# Patient Record
Sex: FEMALE
[De-identification: names, ages, dates, MRNs, and addresses within clinical notes are randomized per-mention and may not be internally consistent; named-entity substitution may affect disease eponyms.]

---

## 2017-09-28 ENCOUNTER — HOSPITAL ENCOUNTER (OUTPATIENT)
Dept: HOSPITAL 42 - ENDO | Age: 77
Discharge: HOME | End: 2017-09-28
Attending: SPECIALIST
Payer: MEDICARE

## 2017-09-28 VITALS
SYSTOLIC BLOOD PRESSURE: 19 MMHG | HEART RATE: 57 BPM | DIASTOLIC BLOOD PRESSURE: 63 MMHG | RESPIRATION RATE: 19 BRPM | TEMPERATURE: 97.6 F | OXYGEN SATURATION: 100 %

## 2017-09-28 VITALS — BODY MASS INDEX: 18.8 KG/M2

## 2017-09-28 DIAGNOSIS — K64.8: ICD-10-CM

## 2017-09-28 DIAGNOSIS — K57.30: Primary | ICD-10-CM

## 2017-11-27 ENCOUNTER — HOSPITAL ENCOUNTER (EMERGENCY)
Dept: HOSPITAL 42 - ED | Age: 77
Discharge: HOME | End: 2017-11-27
Payer: MEDICARE

## 2017-11-27 VITALS — BODY MASS INDEX: 18.8 KG/M2

## 2017-11-27 VITALS — SYSTOLIC BLOOD PRESSURE: 118 MMHG | RESPIRATION RATE: 16 BRPM | HEART RATE: 92 BPM | DIASTOLIC BLOOD PRESSURE: 73 MMHG

## 2017-11-27 VITALS — OXYGEN SATURATION: 100 %

## 2017-11-27 VITALS — TEMPERATURE: 97.5 F

## 2017-11-27 DIAGNOSIS — D64.9: Primary | ICD-10-CM

## 2017-11-27 DIAGNOSIS — E11.9: ICD-10-CM

## 2017-11-27 DIAGNOSIS — Z88.0: ICD-10-CM

## 2017-11-27 DIAGNOSIS — Z87.891: ICD-10-CM

## 2017-11-27 DIAGNOSIS — R63.4: ICD-10-CM

## 2017-11-27 LAB
ALBUMIN/GLOB SERPL: 1.3 {RATIO} (ref 1.1–1.8)
ALP SERPL-CCNC: 85 U/L (ref 38–126)
ALT SERPL-CCNC: 26 U/L (ref 7–56)
AST SERPL-CCNC: 23 U/L (ref 14–36)
BASOPHILS # BLD AUTO: 0.06 K/MM3 (ref 0–2)
BASOPHILS NFR BLD: 0.6 % (ref 0–3)
BILIRUB SERPL-MCNC: 0.7 MG/DL (ref 0.2–1.3)
BUN SERPL-MCNC: 19 MG/DL (ref 7–21)
CALCIUM SERPL-MCNC: 9.8 MG/DL (ref 8.4–10.5)
CHLORIDE SERPL-SCNC: 89 MMOL/L (ref 98–107)
CO2 SERPL-SCNC: 25 MMOL/L (ref 21–33)
EOSINOPHIL # BLD: 0.1 10*3/UL (ref 0–0.7)
EOSINOPHIL NFR BLD: 1.1 % (ref 1.5–5)
ERYTHROCYTE [DISTWIDTH] IN BLOOD BY AUTOMATED COUNT: 15.7 % (ref 11.5–14.5)
GLOBULIN SER-MCNC: 3.2 GM/DL
GLUCOSE SERPL-MCNC: 100 MG/DL (ref 70–110)
GRANULOCYTES # BLD: 8.42 10*3/UL (ref 1.4–6.5)
GRANULOCYTES NFR BLD: 79.7 % (ref 50–68)
HCT VFR BLD CALC: 28.1 % (ref 36–48)
LYMPHOCYTES # BLD: 1.4 10*3/UL (ref 1.2–3.4)
LYMPHOCYTES NFR BLD AUTO: 13.6 % (ref 22–35)
MCH RBC QN AUTO: 22.7 PG (ref 25–35)
MCHC RBC AUTO-ENTMCNC: 31 G/DL (ref 31–37)
MCV RBC AUTO: 73.2 FL (ref 80–105)
MONOCYTES # BLD AUTO: 0.5 10*3/UL (ref 0.1–0.6)
MONOCYTES NFR BLD: 5 % (ref 1–6)
PLATELET # BLD: 428 10^3/UL (ref 120–450)
PMV BLD AUTO: 8.7 FL (ref 7–11)
POTASSIUM SERPL-SCNC: 3.2 MMOL/L (ref 3.6–5)
PROT SERPL-MCNC: 7.2 G/DL (ref 5.8–8.3)
SODIUM SERPL-SCNC: 128 MMOL/L (ref 132–148)
TROPONIN I SERPL-MCNC: < 0.01 NG/ML
WBC # BLD AUTO: 10.6 10^3/UL (ref 4.5–11)

## 2017-11-27 PROCEDURE — 84484 ASSAY OF TROPONIN QUANT: CPT

## 2017-11-27 PROCEDURE — 85025 COMPLETE CBC W/AUTO DIFF WBC: CPT

## 2017-11-27 PROCEDURE — 82550 ASSAY OF CK (CPK): CPT

## 2017-11-27 PROCEDURE — 80053 COMPREHEN METABOLIC PANEL: CPT

## 2017-11-27 PROCEDURE — 71010: CPT

## 2017-11-27 PROCEDURE — 96360 HYDRATION IV INFUSION INIT: CPT

## 2017-11-27 PROCEDURE — 99285 EMERGENCY DEPT VISIT HI MDM: CPT

## 2017-11-27 PROCEDURE — 93005 ELECTROCARDIOGRAM TRACING: CPT

## 2017-11-27 PROCEDURE — 83615 LACTATE (LD) (LDH) ENZYME: CPT

## 2017-11-27 NOTE — ED PDOC
Arrival/HPI





- General


Chief Complaint: Abdominal Pain


Time Seen by Provider: 11/27/17 17:38


Historian: Patient





- History of Present Illness


Narrative History of Present Illness (Text): 


11/27/17 17:42


A 77 year old female, whose past medical history includes PAD, CAD, peripheral 

neuropathy, syringomyelia, bullous pemphigous p/w lightheadedness, was sent by 

Dr. Singer for evaluation due to patient not appearing well. Patient of Dr. Rosa and Dr. Singer. For the last 2 years, patient has lost 95 lbs due to 

having difficulty eating. Patient experiences abdominal pain when she eats, and 

pain has worsened since last week. She has had workup evaluation for unknown 

cancer. Patient has been seen by PMD, pulmonologist, nephrologist, and has had 

multiple CT scans of the chest, abdomen, and pelvis. Also, patient has had EGD 

and PET scan lit up in axilla; along with four biopsy examinations, all which 

were negative. Today, patient became hypotensive. Patient no longer smokes, and 

denies EtOH consumption.





PMD: Dr. Tamar Singer








Associated Symptoms (Text): 





11/27/17 20:32


Significant weight loss over the last 2 years. Patient has been seen at 

[facility] multiple times for evaluation of an unknown cancer. 

She has had multiple imaging procedures and also colonoscopy and EGD without a 

diagnosis being made. Today she was seen by her nephrologist and directed to 

the emergency department for "not looking well". I spoke with  who 

reported that the patient was hypotensive in her office. She requested fluids 

and blood work.





Past Medical History





- Provider Review


Nursing Documentation Reviewed: Yes





- Infectious Disease


Hx of Infectious Diseases: None





- Cardiac


Hx Pacemaker: No





- Pulmonary


Hx Pneumonia: Yes





- Neurological


Hx Paralysis: No





- HEENT


Hx HEENT Disorder: Yes ([place] r ear)


Hx Cataracts: Yes (sx)





- Endocrine/Metabolic


Hx Diabetes Mellitus Type 2: Yes





- Hematological/Oncological


Hx Blood Transfusions: No


Hx Blood Transfusion Reaction: No





- Integumentary


Other/Comment: pemphigoid, bullous dermatoses, in remission at present time, 

multiple red discolorations to arms from plavix





- Musculoskeletal/Rheumatological


Hx Musculoskeletal Disorders: No





- Gastrointestinal


Hx Gall Bladder Disease: Yes


Hx Gastroesophageal Reflux: Yes


Hx Gastrointestinal Ulcer: Yes





- Genitourinary/Gynecological


Hx Genitourinary Disorders:  (renal insufficiency)


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 

tubal





- Psychiatric


Hx Psychophysiologic Disorder: No


Hx Substance Use: No





- Surgical History


Hx Cholecystectomy: Yes


Other/Comment: salpingectomy, craniotomy at [facility] to correct syringomiella





- Anesthesia


Hx Anesthesia Reactions: No


Hx Malignant Hyperthermia: No





- Suicidal Assessment


Feels Threatened In Home Enviroment: No





Family/Social History





- Physician Review


Nursing Documentation Reviewed: Yes


Family/Social History: No Known Family HX


Smoking Status: Former Smoker


Hx Alcohol Use: No


Hx Substance Use: No


Hx Substance Use Treatment: No





Allergies/Home Meds


Allergies/Adverse Reactions: 


Allergies





Penicillins Allergy (Verified 11/27/17 17:01)


 RASH


sucralfate [From Carafate] Adverse Reaction (Verified 11/27/17 17:01)


 VOMITING








Home Medications: 


 Home Meds











 Medication  Instructions  Recorded  Confirmed


 


Alprazolam [Xanax] 0.5 mg PO DAILY 06/26/12 11/27/17


 


hydroCHLOROthiazide [Microzide] 12.5 mg PO QOTHERDAY 06/26/12 11/27/17


 


Aspirin 81 mg PO DAILY 11/28/12 11/27/17


 


Cetirizine HCl [Zyrtec Allergy] 10 mg PO HS 03/17/16 11/27/17


 


Clopidogrel [Plavix] 75 mg PO DAILY 03/17/16 11/27/17


 


Metoprolol Succinate [Toprol XL] 25 mg PO DAILY 03/17/16 11/27/17


 


Simvastatin [Zocor] 20 mg PO DAILY 03/17/16 11/27/17


 


Cholecalciferol (Vitamin D3) 1,000 iu PO DAILY 05/11/16 11/27/17





[Vitamin D]   


 


Ubidecarenone [Co Q10] 100 mg PO DAILY 05/11/16 11/27/17


 


Omeprazole [Prilosec] 40 mg PO DAILY 05/12/16 11/27/17














Review of Systems





- Physician Review


All systems were reviewed & negative as marked: Yes





- Review of Systems


Constitutional: Fatigue, Weight Change.  absent: Fevers


Respiratory: absent: SOB


Cardiovascular: absent: Chest Pain, Palpitations, Syncope


Gastrointestinal: Abdominal Pain (caused by eating), Appetite Changes (has been 

unable to eat due to abdominal pain caused by eating; resulting in 95 lb weight 

loss), Anorexia.  absent: Constipation, Diarrhea, Nausea, Vomiting


Genitourinary Female: absent: Dysuria, Frequency, Hematuria


Neurological: absent: Headache, Dizziness, Focal Weakness





Physical Exam


Vital Signs Reviewed: Yes


Vital Signs











  Temp Pulse Resp BP Pulse Ox


 


 11/27/17 20:43   92 H  16  118/73  100


 


 11/27/17 19:16   82  17  136/75  100


 


 11/27/17 16:52  97.5 F L  105 H  18  99/69 L  95











Temperature: Afebrile


Blood Pressure: Normal


Pulse: Regular


Respiratory Rate: Normal


Appearance: Positive for: Cachectic, Other (thin, chronically ill-appearing, 

not acutely ill-appearing)


Pain Distress: None


Mental Status: Positive for: Alert and Oriented X 3





- Systems Exam


Head: Present: Atraumatic, Normocephalic


Pupils: Present: PERRL


Extroacular Muscles: Present: EOMI


Conjunctiva: Present: Normal


Mouth: Present: Moist Mucous Membranes


Pharnyx: No: ERYTHEMA, EXUDATE, TONSILS ENLARGED


Neck: Present: Normal Range of Motion


Respiratory/Chest: Present: Decreased Breath Sounds (diminshed breath sounds 

bilaterally).  No: Respiratory Distress, Accessory Muscle Use, Wheezes, Rales, 

Retracting, Rhonchi, Tachypneic


Cardiovascular: Present: Regular Rate and Rhythm, Normal S1, S2.  No: Murmurs


Abdomen: Present: Normal Bowel Sounds.  No: Tenderness, Distention, Peritoneal 

Signs


Back: Present: Normal Inspection


Upper Extremity: Present: Normal Inspection.  No: Cyanosis, Edema


Lower Extremity: Present: Normal Inspection.  No: Edema


Neurological: Present: GCS=15, CN II-XII Intact, Speech Normal, Motor Func 

Grossly Intact


Skin: Present: Warm, Dry, Pale.  No: Rashes


Lymphatic: No: Cervical Adenopathy


Psychiatric: Present: Alert, Oriented x 3, Normal Insight, Normal Concentration





Medical Decision Making


ED Course and Treatment: 


11/27/17 17:47


Impression: 77 year old female sent in by PMD. Physical exam shows patient 

appears thin, pale, ill-appearing, cachectic, and has diminished breath sounds 

bilaterally.





Plan:


-- EKG


-- Chest X-ray


-- Labs


-- IV Fluids


-- Reassess and disposition





Prior Visits:


Notes and results from previous visits were reviewed. Patient was last seen in 

the emergency department on 03/17/2016 for vomiting, diarrhea, and weight loss. 

Patient was admitted.








Progress Notes:











11/27/17 20:35


EKG shows normal sinus rhythm rate approximately 90 with no acute ST or T-wave 

changes





- Lab Interpretations


Lab Results: 








 11/27/17 19:02 





 11/27/17 19:02 





 Lab Results





11/27/17 19:02: Sodium 128 L, Potassium 3.2 L, Chloride 89 L, Carbon Dioxide 25

, Anion Gap 17, BUN 19, Creatinine 1.0, Est GFR (African Amer) > 60, Est GFR (

Non-Af Amer) 54, Random Glucose 100, Calcium 9.8, Total Bilirubin 0.7, AST 23, 

ALT 26, Alkaline Phosphatase 85, Lactate Dehydrogenase 323 L, Total Creatine 

Kinase 53, Troponin I < 0.01, Total Protein 7.2, Albumin 4.1, Globulin 3.2, 

Albumin/Globulin Ratio 1.3


11/27/17 19:02: WBC 10.6  D, RBC 3.84, Hgb 8.7 L, Hct 28.1 L, MCV 73.2 L, MCH 

22.7 L, MCHC 31.0, RDW 15.7 H, Plt Count 428, MPV 8.7, Gran % 79.7 H, Lymph % (

Auto) 13.6 L, Mono % (Auto) 5.0, Eos % (Auto) 1.1 L, Baso % (Auto) 0.6, Gran # 

8.42 H, Lymph # 1.4, Mono # 0.5, Eos # 0.1, Baso # 0.06











- RAD Interpretation


Radiology Orders: 








11/27/17 17:53


CHEST PORTABLE [RAD] Stat 














- Medication Orders


Current Medication Orders: 











Discontinued Medications





Sodium Chloride (Sodium Chloride 0.9%)  500 mls @ 500 mls/hr IV ONCE ONE


   Stop: 11/27/17 18:53


   Last Admin: 11/27/17 19:02  Dose: 500 mls/hr





eMAR Start Stop


 Document     11/27/17 19:02  SF  (Rec: 11/27/17 19:02    [id number])


     Intravenous Solution


      Start Date                                 11/27/17


      Start Time                                 19:02


      End Date                                   11/27/17


      End time                                   20:02


      Total Infusion Time                        60





Potassium Chloride (K-Dur 20 Meq Er Tab)  20 meq PO STAT STA


   Stop: 11/27/17 19:56











- Scribe Statement


The provider has reviewed the documentation as recorded by the Dee Dee Sher





Provider Ludaibmarely Attestation:


All medical record entries made by the Scribe were at my direction and 

personally dictated by me. I have reviewed the chart and agree that the record 

accurately reflects my personal performance of the history, physical exam, 

medical decision making, and the department course for this patient. I have 

also personally directed, reviewed, and agree with the discharge instructions 

and disposition.








Disposition/Present on Arrival





- Present on Arrival


Any Indicators Present on Arrival: No


History of DVT/PE: No


History of Uncontrolled Diabetes: No


Urinary Catheter: No


History of Decub. Ulcer: No


History Surgical Site Infection Following: None





- Disposition


Have Diagnosis and Disposition been Completed?: Yes


Diagnosis: 


 Anemia, Weight loss





Disposition: HOME/ ROUTINE


Disposition Time: 20:36


Patient Plan: Discharge


Condition: FAIR


Discharge Instructions (ExitCare):  Anemia (ED)


Additional Instructions: 


Must follow-up with your PMD for continued outpatient workup.


Referrals: 


Tamar Singer MD [Primary Care Provider] - Follow up with primary


Forms:  [employer] (English)

## 2017-11-28 NOTE — CARD
--------------- APPROVED REPORT --------------





EKG Measurement

Heart [id number]

UT [id number]

[id number]

[id number]

[id number]



<Conclusion>

Sinus rhythm with premature atrial complexes

ST & T wave abnormality, consider artifacts

Abnormal ECG

## 2017-11-28 NOTE — RAD
HISTORY:

weakness  



COMPARISON:

3/17/2016 



FINDINGS:



LUNGS:

No active pulmonary disease.



PLEURA:

No significant pleural effusion identified, no pneumothorax apparent.



CARDIOVASCULAR:

Normal.



OSSEOUS STRUCTURES:

No significant abnormalities.



VISUALIZED UPPER ABDOMEN:

Normal.



OTHER FINDINGS:

None.



IMPRESSION:

No active disease.

## 2018-06-20 ENCOUNTER — HOSPITAL ENCOUNTER (OUTPATIENT)
Dept: HOSPITAL 42 - CATH | Age: 78
Discharge: HOME | End: 2018-06-20
Attending: INTERNAL MEDICINE
Payer: MEDICARE

## 2018-06-20 VITALS — HEART RATE: 72 BPM | SYSTOLIC BLOOD PRESSURE: 116 MMHG | DIASTOLIC BLOOD PRESSURE: 64 MMHG

## 2018-06-20 VITALS — BODY MASS INDEX: 16.8 KG/M2

## 2018-06-20 VITALS — OXYGEN SATURATION: 100 %

## 2018-06-20 VITALS — RESPIRATION RATE: 18 BRPM

## 2018-06-20 VITALS — TEMPERATURE: 97.3 F

## 2018-06-20 DIAGNOSIS — Z01.818: Primary | ICD-10-CM

## 2018-06-20 DIAGNOSIS — I27.20: ICD-10-CM

## 2018-06-20 DIAGNOSIS — J44.9: ICD-10-CM

## 2018-06-20 DIAGNOSIS — R64: ICD-10-CM

## 2018-06-20 DIAGNOSIS — I25.10: ICD-10-CM

## 2018-06-20 LAB
APTT BLD: 30.6 SECONDS (ref 25.1–36.5)
BASOPHILS # BLD AUTO: 0.09 K/MM3 (ref 0–2)
BASOPHILS NFR BLD: 1.2 % (ref 0–3)
BUN SERPL-MCNC: 13 MG/DL (ref 7–21)
CALCIUM SERPL-MCNC: 9.5 MG/DL (ref 8.4–10.5)
EOSINOPHIL # BLD: 0.4 10*3/UL (ref 0–0.7)
EOSINOPHIL NFR BLD: 4.5 % (ref 1.5–5)
ERYTHROCYTE [DISTWIDTH] IN BLOOD BY AUTOMATED COUNT: 23.6 % (ref 11.5–14.5)
GFR NON-AFRICAN AMERICAN: > 60
GRANULOCYTES # BLD: 5.13 10*3/UL (ref 1.4–6.5)
GRANULOCYTES NFR BLD: 66.6 % (ref 50–68)
HDLC SERPL-MCNC: 61 MG/DL (ref 29–60)
HGB BLD-MCNC: 10.5 G/DL (ref 12–16)
INR PPP: 1.03 (ref 0.93–1.08)
LDLC SERPL-MCNC: 49 MG/DL (ref 0–129)
LYMPHOCYTES # BLD: 1.7 10*3/UL (ref 1.2–3.4)
LYMPHOCYTES NFR BLD AUTO: 22.6 % (ref 22–35)
MCH RBC QN AUTO: 24.1 PG (ref 25–35)
MCHC RBC AUTO-ENTMCNC: 30.9 G/DL (ref 31–37)
MCV RBC AUTO: 78.2 FL (ref 80–105)
MONOCYTES # BLD AUTO: 0.4 10*3/UL (ref 0.1–0.6)
MONOCYTES NFR BLD: 5.1 % (ref 1–6)
PLATELET # BLD: 287 10^3/UL (ref 120–450)
PMV BLD AUTO: 9.4 FL (ref 7–11)
PROTHROMBIN TIME: 11.8 SECONDS (ref 9.4–12.5)
RBC # BLD AUTO: 4.35 10^6/UL (ref 3.5–6.1)
WBC # BLD AUTO: 7.7 10^3/UL (ref 4.5–11)

## 2018-06-20 PROCEDURE — 99152 MOD SED SAME PHYS/QHP 5/>YRS: CPT

## 2018-06-20 PROCEDURE — 93458 L HRT ARTERY/VENTRICLE ANGIO: CPT

## 2018-06-20 PROCEDURE — 85025 COMPLETE CBC W/AUTO DIFF WBC: CPT

## 2018-06-20 PROCEDURE — 80048 BASIC METABOLIC PNL TOTAL CA: CPT

## 2018-06-20 PROCEDURE — 85610 PROTHROMBIN TIME: CPT

## 2018-06-20 PROCEDURE — 80061 LIPID PANEL: CPT

## 2018-06-20 PROCEDURE — 86900 BLOOD TYPING SEROLOGIC ABO: CPT

## 2018-06-20 PROCEDURE — 36415 COLL VENOUS BLD VENIPUNCTURE: CPT

## 2018-06-20 PROCEDURE — 85730 THROMBOPLASTIN TIME PARTIAL: CPT

## 2018-06-20 PROCEDURE — 86850 RBC ANTIBODY SCREEN: CPT

## 2018-06-20 NOTE — CARDCATH
PROCEDURE DATE:  06/20/2018



CARDIAC CATHETERIZATION



HISTORY:  The patient is a 77-year-old woman with a history of pulmonary

hypertension, COPD and CAD who presents for preoperative clearance for

mesenteric surgery.



She has had progressive cachexia over the past several years.



A stress test was performed, which revealed an ischemic area in the apical

region of the heart.  Cardiac catheterization was recommended.



PROCEDURE:  Left heart catheterization with coronary arteriography and left

ventriculogram.



The right femoral artery was cannulated with a 6-Kisw[place]ili sheath.  There were

no complications.



I performed moderate sedation, which included the presence of an

independent trained observer that assisted in monitoring the patient's

level of consciousness and physiologic status.  After administration of

Versed and fentanyl, my intra service time was 15 minutes.



The findings on catheterization revealed a left ventricle that was within

normal limits.



Estimated ejection fraction of 55-60%.



Her coronary anatomy revealed a right dominant circulation.  The RCA

revealed a 50% stenosis at the ostium of the RCA, but no critical lesions

noted.



The left main artery revealed intimal irregularities without critical

lesions.



The LAD and diagonal vessels revealed diffuse atherosclerosis without

critical lesions.



The circumflex artery and obtuse marginal branches revealed diffuse intimal

irregularities without critical lesions.



Manual compression was used to close the femoral artery site.  The patient

tolerated the procedure well.



In summary, the procedure revealed diffuse atherosclerosis in the coronary

tree with a 50% stenosis in the ostium of the RCA.  No critical lesions

were noted.



LV function is normal.



Given these findings, the patient's cardiac status is at its optimum for

her planned mesenteric surgery.





No further cardiac intervention is necessary.







__________________________________________

Faisal Dick MD



DD:  06/20/2018 8:53:57

DT:  06/20/2018 8:57:20

Job # [id number]